# Patient Record
Sex: FEMALE | Race: OTHER | Employment: OTHER | URBAN - METROPOLITAN AREA
[De-identification: names, ages, dates, MRNs, and addresses within clinical notes are randomized per-mention and may not be internally consistent; named-entity substitution may affect disease eponyms.]

---

## 2019-11-07 ENCOUNTER — HOSPITAL ENCOUNTER (OUTPATIENT)
Dept: CV DIAGNOSTICS | Facility: HOSPITAL | Age: 79
Discharge: HOME | End: 2019-11-07
Attending: FAMILY MEDICINE
Payer: MEDICARE

## 2019-11-07 DIAGNOSIS — E78.5 HYPERLIPIDEMIA, UNSPECIFIED HYPERLIPIDEMIA TYPE: ICD-10-CM

## 2019-11-07 DIAGNOSIS — I10 ESSENTIAL (PRIMARY) HYPERTENSION: ICD-10-CM

## 2019-11-07 DIAGNOSIS — R53.83 FATIGUE, UNSPECIFIED TYPE: ICD-10-CM

## 2019-11-07 DIAGNOSIS — R06.02 SHORTNESS OF BREATH ON EXERTION: ICD-10-CM

## 2019-11-07 PROCEDURE — 93306 TTE W/DOPPLER COMPLETE: CPT | Performed by: FAMILY MEDICINE

## 2019-11-07 PROCEDURE — 78452 HT MUSCLE IMAGE SPECT MULT: CPT | Performed by: FAMILY MEDICINE

## 2019-11-07 PROCEDURE — 93018 CV STRESS TEST I&R ONLY: CPT | Performed by: FAMILY MEDICINE

## 2019-11-07 PROCEDURE — 93017 CV STRESS TEST TRACING ONLY: CPT | Performed by: FAMILY MEDICINE

## 2021-02-24 PROBLEM — E78.5 DYSLIPIDEMIA ASSOCIATED WITH TYPE 2 DIABETES MELLITUS (HCC): Status: ACTIVE | Noted: 2021-02-24

## 2021-02-24 PROBLEM — E11.649 TYPE 2 DIABETES MELLITUS WITH HYPOGLYCEMIA WITHOUT COMA, WITHOUT LONG-TERM CURRENT USE OF INSULIN (HCC): Status: ACTIVE | Noted: 2021-02-24

## 2021-02-24 PROBLEM — E11.69 DYSLIPIDEMIA ASSOCIATED WITH TYPE 2 DIABETES MELLITUS (HCC): Status: ACTIVE | Noted: 2021-02-24

## 2021-02-24 PROBLEM — I10 ESSENTIAL HYPERTENSION: Status: ACTIVE | Noted: 2021-02-24

## 2023-10-14 ENCOUNTER — EXTERNAL LAB (OUTPATIENT)
Dept: OTHER | Age: 83
End: 2023-10-14

## 2023-10-14 LAB
ALBUMIN SERPL-MCNC: 4.5 G/DL (ref 3.6–5.1)
ALBUMIN/GLOB SERPL: 1.3 (CALC) (ref 1–2.5)
ALP SERPL-CCNC: 43 U/L (ref 37–153)
ALT SERPL-CCNC: 11 U/L (ref 6–29)
AST SERPL-CCNC: 17 U/L (ref 10–35)
BILIRUB SERPL-MCNC: 0.4 MG/DL (ref 0.2–1.2)
BUN SERPL-MCNC: 8 MG/DL (ref 7–25)
BUN/CREAT SERPL: ABNORMAL (CALC) (ref 6–22)
CALCIUM SERPL-MCNC: 9.6 MG/DL (ref 8.6–10.4)
CHLORIDE SERPL-SCNC: 99 MMOL/L (ref 98–110)
CO2 SERPL-SCNC: 26 MMOL/L (ref 20–32)
CREAT SERPL-MCNC: 0.84 MG/DL (ref 0.6–0.95)
CREAT UR-MCNC: 18 MG/DL (ref 20–275)
GFR SERPLBLD SCHWARTZ-ARVRAT: 69 ML/MIN/1.73M2
GLOBULIN SER-MCNC: 3.5 G/DL (CALC) (ref 1.9–3.7)
GLUCOSE SERPL-MCNC: 116 MG/DL (ref 65–99)
LENGTH OF FAST TIME PATIENT: YES H
MICROALBUMIN UR-MCNC: 1.2 MG/DL
MICROALBUMIN/CREAT UR: 67 MCG/MG CREAT
POTASSIUM SERPL-SCNC: 4.2 MMOL/L (ref 3.5–5.3)
PROT SERPL-MCNC: 8 G/DL (ref 6.1–8.1)
SODIUM SERPL-SCNC: 136 MMOL/L (ref 135–146)

## 2024-06-27 ENCOUNTER — HOSPITAL ENCOUNTER (OUTPATIENT)
Dept: BONE DENSITY | Age: 84
Discharge: HOME | End: 2024-06-27
Attending: FAMILY MEDICINE

## 2024-06-27 DIAGNOSIS — M81.0 OSTEOPOROSIS: ICD-10-CM

## 2024-06-27 PROCEDURE — 77080 DXA BONE DENSITY AXIAL: CPT | Performed by: FAMILY MEDICINE

## 2024-06-28 ENCOUNTER — LAB ENCOUNTER (OUTPATIENT)
Dept: LAB | Age: 84
End: 2024-06-28
Attending: FAMILY MEDICINE
Payer: MEDICARE

## 2024-06-28 DIAGNOSIS — D64.9 ANEMIA, UNSPECIFIED: ICD-10-CM

## 2024-06-28 DIAGNOSIS — E78.5 HYPERLIPIDEMIA: ICD-10-CM

## 2024-06-28 DIAGNOSIS — R79.89 HYPOURICEMIA: ICD-10-CM

## 2024-06-28 DIAGNOSIS — E11.9 DIABETES MELLITUS (HCC): Primary | ICD-10-CM

## 2024-06-28 LAB
ALBUMIN SERPL-MCNC: 3.6 G/DL (ref 3.4–5)
ALBUMIN/GLOB SERPL: 0.9 {RATIO} (ref 1–2)
ALP LIVER SERPL-CCNC: 44 U/L
ALT SERPL-CCNC: 24 U/L
ANION GAP SERPL CALC-SCNC: 6 MMOL/L (ref 0–18)
AST SERPL-CCNC: 14 U/L (ref 15–37)
BASOPHILS # BLD AUTO: 0.04 X10(3) UL (ref 0–0.2)
BASOPHILS NFR BLD AUTO: 0.5 %
BILIRUB SERPL-MCNC: 0.3 MG/DL (ref 0.1–2)
BUN BLD-MCNC: 19 MG/DL (ref 9–23)
CALCIUM BLD-MCNC: 9.3 MG/DL (ref 8.5–10.1)
CHLORIDE SERPL-SCNC: 107 MMOL/L (ref 98–112)
CHOLEST SERPL-MCNC: 137 MG/DL (ref ?–200)
CO2 SERPL-SCNC: 27 MMOL/L (ref 21–32)
CREAT BLD-MCNC: 1.01 MG/DL
EGFRCR SERPLBLD CKD-EPI 2021: 55 ML/MIN/1.73M2 (ref 60–?)
EOSINOPHIL # BLD AUTO: 0.42 X10(3) UL (ref 0–0.7)
EOSINOPHIL NFR BLD AUTO: 5.2 %
ERYTHROCYTE [DISTWIDTH] IN BLOOD BY AUTOMATED COUNT: 12.5 %
EST. AVERAGE GLUCOSE BLD GHB EST-MCNC: 174 MG/DL (ref 68–126)
FASTING PATIENT LIPID ANSWER: YES
FASTING STATUS PATIENT QL REPORTED: YES
GLOBULIN PLAS-MCNC: 4 G/DL (ref 2.8–4.4)
GLUCOSE BLD-MCNC: 139 MG/DL (ref 70–99)
HBA1C MFR BLD: 7.7 % (ref ?–5.7)
HCT VFR BLD AUTO: 36.4 %
HDLC SERPL-MCNC: 49 MG/DL (ref 40–59)
HGB BLD-MCNC: 11.9 G/DL
IMM GRANULOCYTES # BLD AUTO: 0.02 X10(3) UL (ref 0–1)
IMM GRANULOCYTES NFR BLD: 0.2 %
LDLC SERPL CALC-MCNC: 56 MG/DL (ref ?–100)
LYMPHOCYTES # BLD AUTO: 2.81 X10(3) UL (ref 1–4)
LYMPHOCYTES NFR BLD AUTO: 34.9 %
MCH RBC QN AUTO: 30 PG (ref 26–34)
MCHC RBC AUTO-ENTMCNC: 32.7 G/DL (ref 31–37)
MCV RBC AUTO: 91.7 FL
MONOCYTES # BLD AUTO: 0.78 X10(3) UL (ref 0.1–1)
MONOCYTES NFR BLD AUTO: 9.7 %
NEUTROPHILS # BLD AUTO: 3.98 X10 (3) UL (ref 1.5–7.7)
NEUTROPHILS # BLD AUTO: 3.98 X10(3) UL (ref 1.5–7.7)
NEUTROPHILS NFR BLD AUTO: 49.5 %
NONHDLC SERPL-MCNC: 88 MG/DL (ref ?–130)
OSMOLALITY SERPL CALC.SUM OF ELEC: 295 MOSM/KG (ref 275–295)
PLATELET # BLD AUTO: 258 10(3)UL (ref 150–450)
POTASSIUM SERPL-SCNC: 5.2 MMOL/L (ref 3.5–5.1)
PROT SERPL-MCNC: 7.6 G/DL (ref 6.4–8.2)
RBC # BLD AUTO: 3.97 X10(6)UL
SODIUM SERPL-SCNC: 140 MMOL/L (ref 136–145)
TRIGL SERPL-MCNC: 199 MG/DL (ref 30–149)
TSI SER-ACNC: 2.14 MIU/ML (ref 0.36–3.74)
VLDLC SERPL CALC-MCNC: 29 MG/DL (ref 0–30)
WBC # BLD AUTO: 8.1 X10(3) UL (ref 4–11)

## 2024-06-28 PROCEDURE — 84443 ASSAY THYROID STIM HORMONE: CPT

## 2024-06-28 PROCEDURE — 36415 COLL VENOUS BLD VENIPUNCTURE: CPT

## 2024-06-28 PROCEDURE — 80053 COMPREHEN METABOLIC PANEL: CPT

## 2024-06-28 PROCEDURE — 83036 HEMOGLOBIN GLYCOSYLATED A1C: CPT

## 2024-06-28 PROCEDURE — 85025 COMPLETE CBC W/AUTO DIFF WBC: CPT

## 2024-06-28 PROCEDURE — 80061 LIPID PANEL: CPT

## 2024-08-09 ENCOUNTER — TELEPHONE (OUTPATIENT)
Facility: CLINIC | Age: 84
End: 2024-08-09

## 2024-08-09 ENCOUNTER — OFFICE VISIT (OUTPATIENT)
Facility: CLINIC | Age: 84
End: 2024-08-09
Payer: MEDICARE

## 2024-08-09 VITALS
WEIGHT: 94.31 LBS | DIASTOLIC BLOOD PRESSURE: 52 MMHG | BODY MASS INDEX: 19.01 KG/M2 | HEIGHT: 59 IN | SYSTOLIC BLOOD PRESSURE: 106 MMHG | OXYGEN SATURATION: 95 % | RESPIRATION RATE: 16 BRPM | HEART RATE: 75 BPM

## 2024-08-09 DIAGNOSIS — J45.50 SEVERE PERSISTENT ASTHMA WITHOUT COMPLICATION (HCC): Primary | ICD-10-CM

## 2024-08-09 PROCEDURE — 99204 OFFICE O/P NEW MOD 45 MIN: CPT | Performed by: INTERNAL MEDICINE

## 2024-08-09 RX ORDER — FLUTICASONE PROPIONATE AND SALMETEROL XINAFOATE 230; 21 UG/1; UG/1
AEROSOL, METERED RESPIRATORY (INHALATION)
COMMUNITY
Start: 2024-03-11 | End: 2024-08-09

## 2024-08-09 RX ORDER — TIOTROPIUM BROMIDE INHALATION SPRAY 3.12 UG/1
2 SPRAY, METERED RESPIRATORY (INHALATION) DAILY
Qty: 1 EACH | Refills: 5 | Status: SHIPPED | OUTPATIENT
Start: 2024-08-09

## 2024-08-09 RX ORDER — BUDESONIDE, GLYCOPYRROLATE, AND FORMOTEROL FUMARATE 160; 9; 4.8 UG/1; UG/1; UG/1
2 AEROSOL, METERED RESPIRATORY (INHALATION) 2 TIMES DAILY
Qty: 10.7 G | Refills: 5 | Status: SHIPPED | OUTPATIENT
Start: 2024-08-09 | End: 2024-08-09

## 2024-08-09 RX ORDER — SITAGLIPTIN 50 MG/1
TABLET, FILM COATED ORAL
COMMUNITY
Start: 2024-07-24

## 2024-08-09 RX ORDER — BUDESONIDE AND FORMOTEROL FUMARATE DIHYDRATE 160; 4.5 UG/1; UG/1
2 AEROSOL RESPIRATORY (INHALATION) 2 TIMES DAILY
Qty: 1 EACH | Refills: 5 | Status: SHIPPED | OUTPATIENT
Start: 2024-08-09

## 2024-08-09 RX ORDER — BUDESONIDE 0.5 MG/2ML
0.5 INHALANT ORAL DAILY
Qty: 120 ML | Refills: 5 | Status: SHIPPED | OUTPATIENT
Start: 2024-08-09

## 2024-08-09 RX ORDER — PREGABALIN 100 MG/1
CAPSULE ORAL
COMMUNITY
Start: 2024-06-25

## 2024-08-09 RX ORDER — LEVOTHYROXINE SODIUM 0.03 MG/1
TABLET ORAL
COMMUNITY
Start: 2024-07-24

## 2024-08-09 NOTE — PROGRESS NOTES
[facility] General Pulmonary Consult Note    Chief Complaint:  Chief Complaint   Patient presents with    New Patient     Establishing care Hx COPD        History of Present Illness:  Adrienne Morin is a 84 year old female with significant PMH of asthma who presents today for evaluation of asthma/COPD.  Has been told that she has COPD before - patient is a never smoker.      Is on advair BID then albuterol neb 2-3 times per day and montelukast.    Has been having worsening shortness of breath on exertion with wheezing and chest tightness.      PFTs from July 2023 FVC 0.74 56% FEV1 0.46 52% ratio 0.62    Past Medical History:   Past Medical History:    Allergic rhinitis    Asthma (HCC)    COPD (chronic obstructive pulmonary disease) (HCC)    Diabetes (HCC)    Essential hypertension    Hyperlipidemia    Thyroid disease        Past Surgical History:   Past Surgical History:   Procedure Laterality Date    Cataract         Family Medical History:   Family History   Problem Relation Age of Onset    Heart Disorder Mother     Diabetes Father     Heart Disorder Father         Social History:   Social History     Socioeconomic History    Marital status:      Spouse name: Not on file    Number of children: Not on file    Years of education: Not on file    Highest education level: Not on file   Occupational History    Not on file   Tobacco Use    Smoking status: Never    Smokeless tobacco: Never   Substance and Sexual Activity    Alcohol use: Never    Drug use: Never    Sexual activity: Not on file   Other Topics Concern    Not on file   Social History Narrative    Not on file     Social Determinants of Health     Financial Resource Strain: Not on file   Food Insecurity: Not on file   Transportation Needs: Not on file   Physical Activity: Not on file   Stress: Not on file   Social Connections: Unknown (3/9/2021)    Received from [facility]    Social Connections     Conversations with  friends/family/neighbors per week: Not on file   Housing Stability: Low Risk  (7/10/2021)    Received from [facility]    Housing Stability     Mortgage Payment Concerns?: Not on file     Number of Places Lived in the Last Year: Not on file     Unstable Housing?: Not on file        Allergies: Penicillins     Medications:   Current Outpatient Medications   Medication Sig Dispense Refill    JANUVIA 50 MG Oral Tab       pregabalin 100 MG Oral Cap       levothyroxine 25 MCG Oral Tab       budeson-glycopyrrol-formoterol (BREZTRI AEROSPHERE) 160-9-4.8 MCG/ACT Inhalation Aerosol Inhale 2 puffs into the lungs 2 (two) times daily. 10.7 g 5    budesonide 0.5 MG/2ML Inhalation Suspension Take 2 mL (0.5 mg total) by nebulization daily. 120 mL 5    metFORMIN HCl 500 MG Oral Tab TAKE 2 TABLETS BY MOUTH DAILY FOR DIABETES 180 tablet 1    Accu-Chek Softclix Lancets Does not apply Misc as directed      alendronate 70 MG Oral Tab TAKE 1 TABLET ONCE A WEEK 1ST THING IN MORNING ON EMPTY STOMACH WITH FULL GLASS OF WATER      Montelukast Sodium 10 MG Oral Tab Take 1 tablet (10 mg total) by mouth daily.      glipiZIDE ER 5 MG Oral Tablet 24 Hr 1 tablet with food      Magnesium 300 MG Oral Cap 1 capsule with a meal      Esomeprazole Magnesium 40 MG Oral Capsule Delayed Release TAKE 1 CAPSULE BY MOUTH ONCE A DAY FOR ACID REFLUX (STOMACH PROBLEM)      Desonide 0.05 % External Cream 1 application      Calcium Carb-Cholecalciferol (CALCIUM+D3) 600-800 MG-UNIT Oral Tab 1 tablet with a meal      Clopidogrel Bisulfate 75 MG Oral Tab TAKE 1 TABLET BY MOUTH ONCE DAILY FOR BLOOD THINNER      Rosuvastatin Calcium 10 MG Oral Capsule Sprinkle TAKE 1 TABLET BY MOUTH AT BEDTIME FOR CHOLESTEROL      Budesonide-Formoterol Fumarate (SYMBICORT) 80-4.5 MCG/ACT Inhalation Aerosol INHALE 2 PUFFS BY MOUTH TWICE DAILY      Albuterol Sulfate HFA (VENTOLIN HFA) 108 (90 Base) MCG/ACT Inhalation Aero Soln TAKE 2 PUFFS EVERY 6 HOURS AS NEEDED FOR  WHEEZING      Telmisartan 80 MG Oral Tab 1 tablet      Lancets (ONETOUCH DELICA PLUS [id number]) Does not apply Misc USE 3 TO 4 TIMES DAILY AS DIRECTED      OneTouch UltraSoft Lancets Does not apply Misc as directed      Alcohol Swabs (PRO COMFORT ALCOHOL) 70 % Does not apply Pads USE 3 TO 4 TIMES DAILY AS DIRECTED      MELOXICAM 15 MG Oral Tab TAKE 1 TABLET (15 MG TOTAL) BY MOUTH DAILY. WITH FOOD 30 tablet 2       Review of Systems: Review of Systems    Physical Exam:  /52 (BP Location: Right arm, Patient Position: Sitting, Cuff Size: adult)   Pulse 75   Resp 16   Ht 4' 11\" (1.499 m)   Wt 94 lb 4.8 oz (42.8 kg)   SpO2 95%   BMI 19.05 kg/m²      Constitutional: alert, cooperative. No acute distress.  HEENT: Head NC/AT. Nares normal. Septum midline. Mucosa normal. No drainage or sinus tenderness.. Mallampati 1+  Cardio: Regular rate and rhythm. Normal S1 and S2. No murmurs.   Respiratory: Thorax symmetrical with no labored breathing. clear to auscultation bilaterally  GI: NABS. Abd soft, non-tender.  Extremities: No clubbing or cyanosis. No BLE edema.    Neurologic: A&Ox3. No gross motor deficits.  Skin: Warm, dry  Psych: Calm, cooperative. Pleasant affect.    Results:  Personally reviewed  WBC: 8.1, done on 6/28/2024.  HGB: 11.9, done on 6/28/2024.  PLT: 258, done on 6/28/2024.     Glucose: 139, done on 6/28/2024.  Cr: 1.01, done on 6/28/2024.  Last eGFR was 55 on 6/28/2024.  CA: 9.3, done on 6/28/2024.  Na: 140, done on 6/28/2024.  K: 5.2, done on 6/28/2024.  Cl: 107, done on 6/28/2024.  CO2: 27, done on 6/28/2024.  Last ALB was 3.6% done on 6/28/2024.     XR DEXA BONE DENSITOMETRY (CPT=77080)    Result Date: 6/27/2024  CONCLUSION:  Bone mineral density values for left hip are compatible with the diagnosis of osteoporosis by WHO definition (T score less than -2.5). If therapy is initiated, a follow-up study in 1 year may aid in evaluation of therapeutic efficacy.  Recommendation:  The National  Osteoporosis Foundation (NOF) recommends pharmacological treatment for patients with a FRAX 10-year risk score of 3% or higher for a hip fracture or 20% or higher for a major osteoporotic fracture, to prevent osteoporosis and reduce fracture risk.  The World Health Organization has defined the following categories based on bone density: Normal bone:  T-score greater than or equal to -1 Osteopenia: T-score  less than -1 and greater  than -2.5 Osteoporosis:  T-score less than or equal -2.5   LOCATION:  [facility]     Dictated by (CST): Ellie Scott MD on 6/27/2024 at 12:01 PM     Finalized by (CST): Ellie Scott MD on 6/27/2024 at 12:01 PM         Assessment/Plan:  #1. Severe persistent asthma FEV1 52%  Has been on advair for long time  Increase to [facility].  I dont think trelegy would be good option for her with her ability take a deep breath in  Recheck PFTs  Check asthma allergen panel  I suspect will be a good candidate for biologics down the line as I suspect she has fixed airways obstruction in the setting of severe asthma  The pathology and natural history of asthma was explained to the patient. We reviewed the role of pharmacotherapy, the need for exercise and avoidance of smoke and other environmental pollutants and allergens. We've discussed the importance of compliance with the various treatment modalities such as beta agonists and inhaled steroids to achieve adequate asthma control which was defined for the patient as requiring use of the rescue inhaler less than 2x/week, not more than 2 nocturnal awakenings due to asthma in a month, and few or no limitations in exercise or other physical activities due to asthma. The concepts of prophylactic and episodic therapy have been discussed. The patient indicates understanding of these issues.       Return in about 4 weeks (around 9/6/2024).    Yris Landin MD  8/9/2024

## 2024-08-09 NOTE — TELEPHONE ENCOUNTER
Advised pt that [facility] will not cover Marisai.  Per Dr. Landin, send prescription for Symbicort 160-4.5 + Spiriva 2.5.  Daughter instructed that pt should rinse mouth or brush his teeth after use of Symbicort.  Prescription sent to pharmacy.

## 2024-08-12 ENCOUNTER — TELEPHONE (OUTPATIENT)
Facility: CLINIC | Age: 84
End: 2024-08-12

## 2024-08-12 DIAGNOSIS — J45.50 SEVERE PERSISTENT ASTHMA WITHOUT COMPLICATION (HCC): Primary | ICD-10-CM

## 2024-08-13 ENCOUNTER — HOSPITAL ENCOUNTER (OUTPATIENT)
Dept: GENERAL RADIOLOGY | Age: 84
Discharge: HOME | End: 2024-08-13
Attending: PODIATRIST
Payer: MEDICARE

## 2024-08-13 ENCOUNTER — OFFICE VISIT (OUTPATIENT)
Facility: LOCATION | Age: 84
End: 2024-08-13
Payer: MEDICARE

## 2024-08-13 ENCOUNTER — LAB ENCOUNTER (OUTPATIENT)
Dept: LAB | Age: 84
End: 2024-08-13
Attending: INTERNAL MEDICINE
Payer: MEDICARE

## 2024-08-13 DIAGNOSIS — E11.9 TYPE 2 DIABETES MELLITUS WITHOUT COMPLICATION, UNSPECIFIED WHETHER LONG TERM INSULIN USE (HCC): ICD-10-CM

## 2024-08-13 DIAGNOSIS — M81.0 OSTEOPOROSIS, UNSPECIFIED OSTEOPOROSIS TYPE, UNSPECIFIED PATHOLOGICAL FRACTURE PRESENCE: ICD-10-CM

## 2024-08-13 DIAGNOSIS — J45.50 SEVERE PERSISTENT ASTHMA WITHOUT COMPLICATION (HCC): ICD-10-CM

## 2024-08-13 DIAGNOSIS — M79.671 RIGHT FOOT PAIN: ICD-10-CM

## 2024-08-13 DIAGNOSIS — S92.511A CLOSED DISPLACED FRACTURE OF PROXIMAL PHALANX OF LESSER TOE OF RIGHT FOOT, INITIAL ENCOUNTER: Primary | ICD-10-CM

## 2024-08-13 DIAGNOSIS — R60.0 EDEMA OF RIGHT FOOT: ICD-10-CM

## 2024-08-13 PROCEDURE — 82785 ASSAY OF IGE: CPT

## 2024-08-13 PROCEDURE — 36415 COLL VENOUS BLD VENIPUNCTURE: CPT

## 2024-08-13 PROCEDURE — 28510 TREATMENT OF TOE FRACTURE: CPT | Performed by: PODIATRIST

## 2024-08-13 PROCEDURE — 86003 ALLG SPEC IGE CRUDE XTRC EA: CPT

## 2024-08-13 PROCEDURE — 73630 X-RAY EXAM OF FOOT: CPT | Performed by: PODIATRIST

## 2024-08-13 PROCEDURE — 99203 OFFICE O/P NEW LOW 30 MIN: CPT | Performed by: PODIATRIST

## 2024-08-13 NOTE — PROGRESS NOTES
[facility]  Progress Note    Adrienne Morin is a 84 year old female.   Chief Complaint   Patient presents with    New Patient     Patient tripped/missed a step and twisted the right foot. Rates pain 8/10, she is diabetic usually has numbness/tingling. FBS this am was not taken, per doctor order. Right foot is swollen and a little bruising.         HPI:     Patient is a pleasant 84-year-old diabetic female who is presenting to clinic today with her children due to a right foot injury.  Patient states that she tripped/missed a step and twisted her right foot a few days ago.  She is having pain to her right second digit, rating the pain 8/10.  Patient does relate baseline numbness/tingling due to peripheral neuropathy.  Patient has noticed some swelling.  No other concerns at this time.  Presenting today for further evaluation and care.  Denies recent nausea, vomiting, fevers, chills.      Allergies: Penicillins   Current Outpatient Medications   Medication Sig Dispense Refill    JANUVIA 50 MG Oral Tab       pregabalin 100 MG Oral Cap       levothyroxine 25 MCG Oral Tab       budesonide 0.5 MG/2ML Inhalation Suspension Take 2 mL (0.5 mg total) by nebulization daily. 120 mL 5    SYMBICORT 160-4.5 MCG/ACT Inhalation Aerosol Inhale 2 puffs into the lungs 2 (two) times daily. 1 each 5    SPIRIVA RESPIMAT 2.5 MCG/ACT Inhalation Aero Soln Inhale 2 Inhalations into the lungs daily. 1 each 5    Accu-Chek Softclix Lancets Does not apply Misc as directed      alendronate 70 MG Oral Tab TAKE 1 TABLET ONCE A WEEK 1ST THING IN MORNING ON EMPTY STOMACH WITH FULL GLASS OF WATER      Montelukast Sodium 10 MG Oral Tab Take 1 tablet (10 mg total) by mouth daily.      Magnesium 300 MG Oral Cap 1 capsule with a meal      Esomeprazole Magnesium 40 MG Oral Capsule Delayed Release TAKE 1 CAPSULE BY MOUTH ONCE A DAY FOR ACID REFLUX (STOMACH PROBLEM)      Calcium Carb-Cholecalciferol (CALCIUM+D3) 600-800 MG-UNIT Oral Tab 1  tablet with a meal      Rosuvastatin Calcium 10 MG Oral Capsule Sprinkle TAKE 1 TABLET BY MOUTH AT BEDTIME FOR CHOLESTEROL      Albuterol Sulfate HFA (VENTOLIN HFA) 108 (90 Base) MCG/ACT Inhalation Aero Soln TAKE 2 PUFFS EVERY 6 HOURS AS NEEDED FOR WHEEZING      Telmisartan 80 MG Oral Tab 1 tablet      Lancets (ONETOUCH DELICA PLUS [id number]) Does not apply Misc USE 3 TO 4 TIMES DAILY AS DIRECTED      OneTouch UltraSoft Lancets Does not apply Misc as directed      Alcohol Swabs (PRO COMFORT ALCOHOL) 70 % Does not apply Pads USE 3 TO 4 TIMES DAILY AS DIRECTED      Desonide 0.05 % External Cream 1 application        Past Medical History:    Allergic rhinitis    Asthma (HCC)    COPD (chronic obstructive pulmonary disease) (HCC)    Diabetes (HCC)    Essential hypertension    Hyperlipidemia    Thyroid disease      Past Surgical History:   Procedure Laterality Date    Cataract        Family History   Problem Relation Age of Onset    Heart Disorder Mother     Diabetes Father     Heart Disorder Father       Social History     Socioeconomic History    Marital status:    Tobacco Use    Smoking status: Never    Smokeless tobacco: Never   Substance and Sexual Activity    Alcohol use: Never    Drug use: Never           REVIEW OF SYSTEMS:     10 point ROS completed and was negative, except for pertinent positive and negatives stated in subjective.       EXAM:     GENERAL: well developed, well nourished, in no apparent distress  EXTREMITIES:  1. Integument: Skin appears moist, cool, and atrophic. There are no color changes. No open lesions. No macerations. No Hyperkeratotic lesions.   2. Vascular:  pedal pulses palpable.  capillary refill normal.  Localized edema noted to right forefoot  3. Neurological: Gross sensation intact via light touch bilaterally.    4. Musculoskeletal: Pain with palpation of right second digit.  There is a lateral deviation of the right second digit     XR FOOT, COMPLETE (MIN 3 VIEWS), RIGHT  (RQF=58172)    Result Date: 8/13/2024  CONCLUSION:    FRACTURE proximal aspect of the shaft of the proximal phalanx 2nd toe with minimal displacement.  No other fractures seen.  No dislocation.  Scattered mild arthritic changes not uncommon for age. Plantar calcaneal spur, and spurring at the  insertion of the Achilles tendon onto the calcaneus. Continued clinical and x-ray follow-up advised.  LOCATION:  [facility]   Dictated by (CST): Rick Sauceda MD on 8/13/2024 at 11:57 AM     Finalized by (CST): Rick Sauceda MD on 8/13/2024 at 11:58 AM          ASSESSMENT AND PLAN:   Diagnoses and all orders for this visit:    Closed displaced fracture of proximal phalanx of lesser toe of right foot, initial encounter    Right foot pain  -     XR FOOT, COMPLETE (MIN 3 VIEWS), RIGHT (CPT=73630); Future    Edema of right foot    Type 2 diabetes mellitus without complication, unspecified whether long term insulin use (HCC)    Osteoporosis, unspecified osteoporosis type, unspecified pathological fracture presence        Plan:   -Patient was seen and evaluated today in clinic.  Chart history reviewed.    -Updated radiographs were obtained today and independently reviewed, showing minimally displaced fracture of the proximal phalanx of the right second digit.  See above for impression    -Discussed clinical and radiographical findings with patient today.  Treatment options discussed.    -Fracture care and treatment plan discussed.    -Discussed the importance of immobilization.    -Discussed conservative management     -Discussed surgical management and indications for both.    -Will move forward with conservative management.  Patient does have a history of osteoporosis from a recent DEXA scan on 6/27/2024.  Discussed with patient and her family of complications that can occur due to osteoporosis.  Recommend she continue with vitamin D supplementation daily.    -Recommend cryotherapy/icing, rest, and elevation.    -Second digit was  lightly ela taped with Coban to hallux with a 2 x 2 gauze in between the toes.  Due to orientation of fracture and mild displacement, ela taping to hallux is a better option than ela taping the third digit, which could exacerbate displacement/toe deformity    -Patient provided with a postop shoe today.  Recommend weightbearing as tolerated in postop shoe at all times right now.  Can utilize a walker/cane for gait assistance      -The patient indicates understanding of these issues and agrees to the plan.    Time spent reviewing pertinent information from patient's chart, reviewing any pertinent imaging, obtaining history and physical exam, discussing and mutually agreeing on a treatment plan, and documenting encounter: 30 minutes    RTC 6 weeks for updated x-rays      Gordo Le DPM        8/13/2024    Dragon speech recognition software was used to prepare this note.  Errors in word recognition may occur.  Please contact me with any questions/concerns with this note.

## 2024-08-16 LAB

## 2024-09-23 ENCOUNTER — RT VISIT (OUTPATIENT)
Dept: RESPIRATORY THERAPY | Facility: HOSPITAL | Age: 84
End: 2024-09-23
Attending: INTERNAL MEDICINE
Payer: MEDICARE

## 2024-09-23 DIAGNOSIS — J45.50 SEVERE PERSISTENT ASTHMA WITHOUT COMPLICATION (HCC): ICD-10-CM

## 2024-09-23 PROCEDURE — 94060 EVALUATION OF WHEEZING: CPT

## 2024-09-23 PROCEDURE — 94729 DIFFUSING CAPACITY: CPT

## 2024-09-23 PROCEDURE — 94726 PLETHYSMOGRAPHY LUNG VOLUMES: CPT

## 2024-09-23 NOTE — PROCEDURES
Pulmonary Function Test Report  Findings:  Prebronchodilator FEV1 is 0.63L, z-score -3.55.  Prebronchodilator FVC is 1.00L, z-score -3.59.                           Postbronchodilator FEV1 is 0.73L, z-score -3.26.  Postbronchodilator FVC is 1.25L, z-score -2.92.   FEV1/ FVC ratio is 62 %, z-score -1.69.   There is a significant bronchodilator response after administration of albuterol.    The flow-volume loop demonstrates an obstructive pattern. Possibly also a restrictive pattern as well  The TLC is 3.38L, z-score -2.14. The residual volume 2.17L, z-score 0.38.   The diffusion capacity is 10.63, z-score -2.25 and -2.85 when corrected for alveolar volume.     Impression:  There is mild (z-score -1.65 to -2.5) airway obstruction on spirometry and visualized on flow-volume loop.   and There is a significant bronchodilator response.  There is mild (z-score -1.65 to -2.5) restriction.  This can be seen in heart failure, fluid overload states, interstitial lung disease, thoracic spine/chest disorders, obesity as well as other clinical scenarios.  Further clinical correlation required.    The RV/TLC ratio is elevated which can be seen with obstructive ventilatory defects  Diffusion capacity is mild (z-score -1.65 to -2.5).        Disclaimer: This PFT has been interpreted based on Z-score/LLN/ULN criteria as described in ATS guidelines 2022.   Yris Landin MD  [facility] Pulmonary Medicine  Office: [phone]

## 2024-09-25 ENCOUNTER — OFFICE VISIT (OUTPATIENT)
Facility: LOCATION | Age: 84
End: 2024-09-25
Payer: MEDICARE

## 2024-09-25 ENCOUNTER — HOSPITAL ENCOUNTER (OUTPATIENT)
Dept: GENERAL RADIOLOGY | Age: 84
Discharge: HOME | End: 2024-09-25
Attending: PODIATRIST
Payer: MEDICARE

## 2024-09-25 DIAGNOSIS — S92.511A CLOSED DISPLACED FRACTURE OF PROXIMAL PHALANX OF LESSER TOE OF RIGHT FOOT, INITIAL ENCOUNTER: ICD-10-CM

## 2024-09-25 DIAGNOSIS — M81.0 OSTEOPOROSIS, UNSPECIFIED OSTEOPOROSIS TYPE, UNSPECIFIED PATHOLOGICAL FRACTURE PRESENCE: ICD-10-CM

## 2024-09-25 DIAGNOSIS — S92.511D CLOSED DISPLACED FRACTURE OF PROXIMAL PHALANX OF LESSER TOE OF RIGHT FOOT WITH ROUTINE HEALING, SUBSEQUENT ENCOUNTER: Primary | ICD-10-CM

## 2024-09-25 DIAGNOSIS — M79.674 TOE PAIN, RIGHT: ICD-10-CM

## 2024-09-25 DIAGNOSIS — B35.1 ONYCHOMYCOSIS: ICD-10-CM

## 2024-09-25 DIAGNOSIS — E11.9 TYPE 2 DIABETES MELLITUS WITHOUT COMPLICATION, UNSPECIFIED WHETHER LONG TERM INSULIN USE (HCC): ICD-10-CM

## 2024-09-25 PROCEDURE — 99024 POSTOP FOLLOW-UP VISIT: CPT | Performed by: PODIATRIST

## 2024-09-25 PROCEDURE — 73630 X-RAY EXAM OF FOOT: CPT | Performed by: PODIATRIST

## 2024-09-25 NOTE — PROGRESS NOTES
[facility]  Progress Note    Adrienne Morin is a 84 year old female.   Chief Complaint   Patient presents with    Fracture     Right foot 2nd digit fracture, she is in a surgical shoe and ela taping toes. She rates pain 3-4/10 today in the office.          HPI:     Patient is a pleasant 84-year-old diabetic female who is returning to clinic today for recheck on right second digit fracture.  Patient's daughter is accompanying her today and assists in translation.  Patient is doing well and states that the pain has improved.  Rating the pain today 3-4/10.  She continues to ambulate in a postop shoe.  Has been ela taping, but denies any increased deformities.  Patient's daughter is inquiring about a toenail debridement today as patient cannot safely trim her own toenails.  Denying any other concerns today and is here for further evaluation and care.  Denies recent nausea, vomiting, fevers, chills.      Allergies: Penicillins   Current Outpatient Medications   Medication Sig Dispense Refill    JANUVIA 50 MG Oral Tab       pregabalin 100 MG Oral Cap       levothyroxine 25 MCG Oral Tab       budesonide 0.5 MG/2ML Inhalation Suspension Take 2 mL (0.5 mg total) by nebulization daily. 120 mL 5    SYMBICORT 160-4.5 MCG/ACT Inhalation Aerosol Inhale 2 puffs into the lungs 2 (two) times daily. 1 each 5    SPIRIVA RESPIMAT 2.5 MCG/ACT Inhalation Aero Soln Inhale 2 Inhalations into the lungs daily. 1 each 5    Accu-Chek Softclix Lancets Does not apply Misc as directed      alendronate 70 MG Oral Tab TAKE 1 TABLET ONCE A WEEK 1ST THING IN MORNING ON EMPTY STOMACH WITH FULL GLASS OF WATER      Montelukast Sodium 10 MG Oral Tab Take 1 tablet (10 mg total) by mouth daily.      Magnesium 300 MG Oral Cap 1 capsule with a meal      Esomeprazole Magnesium 40 MG Oral Capsule Delayed Release TAKE 1 CAPSULE BY MOUTH ONCE A DAY FOR ACID REFLUX (STOMACH PROBLEM)      Calcium Carb-Cholecalciferol (CALCIUM+D3) 600-800  MG-UNIT Oral Tab 1 tablet with a meal      Rosuvastatin Calcium 10 MG Oral Capsule Sprinkle TAKE 1 TABLET BY MOUTH AT BEDTIME FOR CHOLESTEROL      Albuterol Sulfate HFA (VENTOLIN HFA) 108 (90 Base) MCG/ACT Inhalation Aero Soln TAKE 2 PUFFS EVERY 6 HOURS AS NEEDED FOR WHEEZING      Telmisartan 80 MG Oral Tab 1 tablet      Lancets (ONETOUCH DELICA PLUS [id number]) Does not apply Misc USE 3 TO 4 TIMES DAILY AS DIRECTED      OneTouch UltraSoft Lancets Does not apply Misc as directed      Alcohol Swabs (PRO COMFORT ALCOHOL) 70 % Does not apply Pads USE 3 TO 4 TIMES DAILY AS DIRECTED      Desonide 0.05 % External Cream 1 application        Past Medical History:    Allergic rhinitis    Asthma (HCC)    COPD (chronic obstructive pulmonary disease) (HCC)    Diabetes (HCC)    Essential hypertension    Hyperlipidemia    Thyroid disease      Past Surgical History:   Procedure Laterality Date    Cataract        Family History   Problem Relation Age of Onset    Heart Disorder Mother     Diabetes Father     Heart Disorder Father       Social History     Socioeconomic History    Marital status:    Tobacco Use    Smoking status: Never    Smokeless tobacco: Never   Substance and Sexual Activity    Alcohol use: Never    Drug use: Never           REVIEW OF SYSTEMS:     10 point ROS completed and was negative, except for pertinent positive and negatives stated in subjective.       EXAM:     GENERAL: well developed, well nourished, in no apparent distress  EXTREMITIES:  1. Integument: Skin appears moist, cool, and atrophic. There are no color changes. No open lesions. No macerations. No Hyperkeratotic lesions.  Toenails x 10 are thickened, elongated, discolored, dystrophic with some subungual debris  2. Vascular:  pedal pulses palpable.  capillary refill normal.  Improved localized edema noted to right forefoot  3. Neurological: Gross sensation intact via light touch bilaterally.    4. Musculoskeletal: Minimal pain with palpation of  right second digit.  There is a lateral deviation of the right second digit, which appears stable from previous visit     XR FOOT WEIGHTBEARING (3 VIEWS), RIGHT   (CPT=73630)    Result Date: 9/25/2024  CONCLUSION:  There is minimal bony callus formation along the healing minimally angulated fracture along the proximal 3rd of the proximal phalanx of the right 2nd digit.    LOCATION:  [facility]   Dictated by (CST): Oh Ryan MD on 9/25/2024 at 11:03 AM     Finalized by (CST): Oh Ryan MD on 9/25/2024 at 11:03 AM          ASSESSMENT AND PLAN:   Diagnoses and all orders for this visit:    Closed displaced fracture of proximal phalanx of lesser toe of right foot with routine healing, subsequent encounter  -     XR FOOT WEIGHTBEARING (3 VIEWS), RIGHT   (CPT=73630); Future    Toe pain, right    Osteoporosis, unspecified osteoporosis type, unspecified pathological fracture presence    Onychomycosis    Type 2 diabetes mellitus without complication, unspecified whether long term insulin use (HCC)        Plan:   -Patient was seen and evaluated today in clinic.  Chart history reviewed.    Updated radiographs were obtained today and independently reviewed, showing some bony callus formation with stable alignment of the second digit with a mild lateral deviation.  See above for official impression    Overall, patient is improved with right second digit.    Discussed further treatment recommendations.  At this time, recommend patient transitioning out of postop shoe and into supportive shoe gear as tolerated.  No longer needs to ela tape the toes.  We will continue to monitor for improvements in pain.    -Discussed treatment options with the patient in regards to toenails.  -If thickness of the toenails are causing discomfort, advised patient to try utilizing Vicks VapoRub on her toenails every night.  The goal would be for the toenails to soften over time.  -Patient relates pain relief with intermittent toenail  debridements.  -Patient elects for nail debridement today. Toenails 1-5 of the left foot and 1-5 of the right foot were debrided today.  They tolerated procedures well, without incident.    -Instrumentation used includes nail nippers.    -The patient indicates understanding of these issues and agrees to the plan.    Time spent reviewing pertinent information from patient's chart, reviewing any pertinent imaging, obtaining history and physical exam, discussing and mutually agreeing on a treatment plan, and documenting encounter: 25 minutes    RTC 2-3 months      Gordo Le DPM        [address] [address]     9/25/2024    Dragon speech recognition software was used to prepare this note.  Errors in word recognition may occur.  Please contact me with any questions/concerns with this note.

## 2024-09-28 ENCOUNTER — LAB ENCOUNTER (OUTPATIENT)
Dept: LAB | Age: 84
End: 2024-09-28
Attending: FAMILY MEDICINE
Payer: MEDICARE

## 2024-09-28 DIAGNOSIS — Z00.00 ROUTINE GENERAL MEDICAL EXAMINATION AT A HEALTH CARE FACILITY: Primary | ICD-10-CM

## 2024-09-28 DIAGNOSIS — R20.8 BURNING SENSATION: ICD-10-CM

## 2024-09-28 DIAGNOSIS — I10 ESSENTIAL HYPERTENSION, MALIGNANT: ICD-10-CM

## 2024-09-28 DIAGNOSIS — E11.9 DIABETES MELLITUS (HCC): ICD-10-CM

## 2024-09-28 DIAGNOSIS — M81.0 SENILE OSTEOPOROSIS: ICD-10-CM

## 2024-09-28 DIAGNOSIS — E11.9 DIABETES MELLITUS WITH NO COMPLICATION (HCC): ICD-10-CM

## 2024-09-28 DIAGNOSIS — D64.9 ANEMIA, UNSPECIFIED: ICD-10-CM

## 2024-09-28 DIAGNOSIS — E78.5 HYPERLIPIDEMIA: ICD-10-CM

## 2024-09-28 LAB
ALBUMIN SERPL-MCNC: 4.8 G/DL (ref 3.2–4.8)
ALBUMIN/GLOB SERPL: 1.5 {RATIO} (ref 1–2)
ALP LIVER SERPL-CCNC: 50 U/L
ALT SERPL-CCNC: 26 U/L
ANION GAP SERPL CALC-SCNC: 7 MMOL/L (ref 0–18)
AST SERPL-CCNC: 22 U/L (ref ?–34)
BASOPHILS # BLD AUTO: 0.05 X10(3) UL (ref 0–0.2)
BASOPHILS NFR BLD AUTO: 0.6 %
BILIRUB SERPL-MCNC: 0.4 MG/DL (ref 0.2–1.1)
BUN BLD-MCNC: 18 MG/DL (ref 9–23)
CALCIUM BLD-MCNC: 10.4 MG/DL (ref 8.7–10.4)
CHLORIDE SERPL-SCNC: 106 MMOL/L (ref 98–112)
CHOLEST SERPL-MCNC: 149 MG/DL (ref ?–200)
CO2 SERPL-SCNC: 26 MMOL/L (ref 21–32)
CREAT BLD-MCNC: 1.01 MG/DL
CREAT UR-SCNC: 26.8 MG/DL
EGFRCR SERPLBLD CKD-EPI 2021: 55 ML/MIN/1.73M2 (ref 60–?)
EOSINOPHIL # BLD AUTO: 0.24 X10(3) UL (ref 0–0.7)
EOSINOPHIL NFR BLD AUTO: 2.8 %
ERYTHROCYTE [DISTWIDTH] IN BLOOD BY AUTOMATED COUNT: 14 %
EST. AVERAGE GLUCOSE BLD GHB EST-MCNC: 171 MG/DL (ref 68–126)
FASTING PATIENT LIPID ANSWER: YES
FASTING STATUS PATIENT QL REPORTED: YES
GLOBULIN PLAS-MCNC: 3.2 G/DL (ref 2–3.5)
GLUCOSE BLD-MCNC: 139 MG/DL (ref 70–99)
HBA1C MFR BLD: 7.6 % (ref ?–5.7)
HCT VFR BLD AUTO: 36.4 %
HDLC SERPL-MCNC: 56 MG/DL (ref 40–59)
HGB BLD-MCNC: 11.8 G/DL
IMM GRANULOCYTES # BLD AUTO: 0.03 X10(3) UL (ref 0–1)
IMM GRANULOCYTES NFR BLD: 0.3 %
LDLC SERPL CALC-MCNC: 66 MG/DL (ref ?–100)
LYMPHOCYTES # BLD AUTO: 2.78 X10(3) UL (ref 1–4)
LYMPHOCYTES NFR BLD AUTO: 32 %
MCH RBC QN AUTO: 30.3 PG (ref 26–34)
MCHC RBC AUTO-ENTMCNC: 32.4 G/DL (ref 31–37)
MCV RBC AUTO: 93.3 FL
MICROALBUMIN UR-MCNC: 0.7 MG/DL
MICROALBUMIN/CREAT 24H UR-RTO: 26.1 UG/MG (ref ?–30)
MONOCYTES # BLD AUTO: 0.83 X10(3) UL (ref 0.1–1)
MONOCYTES NFR BLD AUTO: 9.6 %
NEUTROPHILS # BLD AUTO: 4.75 X10 (3) UL (ref 1.5–7.7)
NEUTROPHILS # BLD AUTO: 4.75 X10(3) UL (ref 1.5–7.7)
NEUTROPHILS NFR BLD AUTO: 54.7 %
NONHDLC SERPL-MCNC: 93 MG/DL (ref ?–130)
OSMOLALITY SERPL CALC.SUM OF ELEC: 292 MOSM/KG (ref 275–295)
PLATELET # BLD AUTO: 299 10(3)UL (ref 150–450)
POTASSIUM SERPL-SCNC: 5.2 MMOL/L (ref 3.5–5.1)
PROT SERPL-MCNC: 8 G/DL (ref 5.7–8.2)
RBC # BLD AUTO: 3.9 X10(6)UL
SODIUM SERPL-SCNC: 139 MMOL/L (ref 136–145)
TRIGL SERPL-MCNC: 160 MG/DL (ref 30–149)
TSI SER-ACNC: 4.78 MIU/ML (ref 0.55–4.78)
VLDLC SERPL CALC-MCNC: 24 MG/DL (ref 0–30)
WBC # BLD AUTO: 8.7 X10(3) UL (ref 4–11)

## 2024-09-28 PROCEDURE — 82043 UR ALBUMIN QUANTITATIVE: CPT

## 2024-09-28 PROCEDURE — 85025 COMPLETE CBC W/AUTO DIFF WBC: CPT

## 2024-09-28 PROCEDURE — 80053 COMPREHEN METABOLIC PANEL: CPT

## 2024-09-28 PROCEDURE — 36415 COLL VENOUS BLD VENIPUNCTURE: CPT

## 2024-09-28 PROCEDURE — 82570 ASSAY OF URINE CREATININE: CPT

## 2024-09-28 PROCEDURE — 80061 LIPID PANEL: CPT

## 2024-09-28 PROCEDURE — 84443 ASSAY THYROID STIM HORMONE: CPT

## 2024-09-28 PROCEDURE — 83036 HEMOGLOBIN GLYCOSYLATED A1C: CPT

## 2024-09-30 ENCOUNTER — OFFICE VISIT (OUTPATIENT)
Facility: CLINIC | Age: 84
End: 2024-09-30
Payer: MEDICARE

## 2024-09-30 VITALS
HEART RATE: 82 BPM | DIASTOLIC BLOOD PRESSURE: 54 MMHG | RESPIRATION RATE: 16 BRPM | OXYGEN SATURATION: 98 % | SYSTOLIC BLOOD PRESSURE: 118 MMHG | WEIGHT: 143 LBS | HEIGHT: 59 IN | BODY MASS INDEX: 28.83 KG/M2

## 2024-09-30 DIAGNOSIS — J45.50 SEVERE PERSISTENT ASTHMA WITHOUT COMPLICATION (HCC): Primary | ICD-10-CM

## 2024-09-30 PROCEDURE — 99214 OFFICE O/P EST MOD 30 MIN: CPT | Performed by: INTERNAL MEDICINE

## 2024-09-30 NOTE — PROGRESS NOTES
[facility] Pulmonary Follow Up Note    Chief Complaint:  Chief Complaint   Patient presents with    Follow - Up     PFT performed 9/23/24.  Labs completed 9-28-24       History of Present Illness:  Adrienne Morin is a 84 year old female who presents today for follow up of asthma/COPD.  Has been told that she has COPD before - patient is a never smoker.       Is on advair BID then albuterol neb 2-3 times per day and montelukast.    Has been having worsening shortness of breath on exertion with wheezing and chest tightness.      Interval history:  Since last visit, patient started on Symbicort/spiriva as well as budesonide nebulizers with some modest improvement.      Past Medical History:   Past Medical History:    Allergic rhinitis    Asthma (HCC)    COPD (chronic obstructive pulmonary disease) (HCC)    Diabetes (HCC)    Essential hypertension    Hyperlipidemia    Thyroid disease        Past Surgical History:   Past Surgical History:   Procedure Laterality Date    Cataract         Family Medical History:   Family History   Problem Relation Age of Onset    Heart Disorder Mother     Diabetes Father     Heart Disorder Father         Social History:   Social History     Socioeconomic History    Marital status:      Spouse name: Not on file    Number of children: Not on file    Years of education: Not on file    Highest education level: Not on file   Occupational History    Not on file   Tobacco Use    Smoking status: Never    Smokeless tobacco: Never   Substance and Sexual Activity    Alcohol use: Never    Drug use: Never    Sexual activity: Not on file   Other Topics Concern    Not on file   Social History Narrative    Not on file     Social Determinants of Health     Financial Resource Strain: Not on file   Food Insecurity: Not on file   Transportation Needs: Not on file   Physical Activity: Not on file   Stress: Not on file   Social Connections: Unknown (3/9/2021)    Received from [facility]     Social Connections     Conversations with friends/family/neighbors per week: Not on file   Housing Stability: Low Risk  (7/10/2021)    Received from [facility]    Housing Stability     Mortgage Payment Concerns?: Not on file     Number of Places Lived in the Last Year: Not on file     Unstable Housing?: Not on file        Medications:   Current Outpatient Medications   Medication Sig Dispense Refill    JANUVIA 50 MG Oral Tab       pregabalin 100 MG Oral Cap       levothyroxine 25 MCG Oral Tab       budesonide 0.5 MG/2ML Inhalation Suspension Take 2 mL (0.5 mg total) by nebulization daily. 120 mL 5    SYMBICORT 160-4.5 MCG/ACT Inhalation Aerosol Inhale 2 puffs into the lungs 2 (two) times daily. 1 each 5    SPIRIVA RESPIMAT 2.5 MCG/ACT Inhalation Aero Soln Inhale 2 Inhalations into the lungs daily. 1 each 5    Accu-Chek Softclix Lancets Does not apply Misc as directed      alendronate 70 MG Oral Tab TAKE 1 TABLET ONCE A WEEK 1ST THING IN MORNING ON EMPTY STOMACH WITH FULL GLASS OF WATER      Montelukast Sodium 10 MG Oral Tab Take 1 tablet (10 mg total) by mouth daily.      Magnesium 300 MG Oral Cap 1 capsule with a meal      Esomeprazole Magnesium 40 MG Oral Capsule Delayed Release TAKE 1 CAPSULE BY MOUTH ONCE A DAY FOR ACID REFLUX (STOMACH PROBLEM)      Calcium Carb-Cholecalciferol (CALCIUM+D3) 600-800 MG-UNIT Oral Tab 1 tablet with a meal      Rosuvastatin Calcium 10 MG Oral Capsule Sprinkle TAKE 1 TABLET BY MOUTH AT BEDTIME FOR CHOLESTEROL      Albuterol Sulfate HFA (VENTOLIN HFA) 108 (90 Base) MCG/ACT Inhalation Aero Soln TAKE 2 PUFFS EVERY 6 HOURS AS NEEDED FOR WHEEZING      Telmisartan 80 MG Oral Tab 1 tablet      Lancets (ONETOUCH DELICA PLUS [id number]) Does not apply Misc USE 3 TO 4 TIMES DAILY AS DIRECTED      OneTouch UltraSoft Lancets Does not apply Misc as directed      Alcohol Swabs (PRO COMFORT ALCOHOL) 70 % Does not apply Pads USE 3 TO 4 TIMES DAILY AS DIRECTED         Review of  Systems: Review of Systems     Physical Exam:  /54   Pulse 82   Resp 16   Ht 4' 11\" (1.499 m)   Wt 143 lb (64.9 kg)   SpO2 98%   BMI 28.88 kg/m²      Constitutional: alert, cooperative. No acute distress.  HEENT: Head NC/AT. Nares normal. Septum midline. Mucosa normal. No drainage or sinus tenderness.  Cardio: Regular rate and rhythm. Normal S1 and S2. No murmurs.   Respiratory: Thorax symmetrical with no labored breathing. clear to auscultation bilaterally  Extremities: No clubbing or cyanosis. No BLE edema.    Neurologic: A&Ox3. No gross motor deficits.  Skin: Warm, dry  Psych: Calm, cooperative. Pleasant affect.    Results:  Personally reviewed  XR FOOT WEIGHTBEARING (3 VIEWS), RIGHT   (CPT=73630)  Narrative: PROCEDURE:  XR FOOT WEIGHTBEARING (3 VIEWS), RIGHT (CPT=73630)     INDICATIONS:  S92.511A Closed displaced fracture of proximal phalanx of lesser toe of right foot, initial encounter     COMPARISON:  [facility], XR, XR FOOT, COMPLETE (MIN 3 VIEWS), RIGHT (CPT=73630), 8/13/2024, 11:41 AM.     PATIENT STATED HISTORY: (As transcribed by Technologist)  Podiatry evaluation. Follow up fracture proximal phalanx 2nd toe         FINDINGS:  There is minimal bony callus formation along the healing minimally angulated fracture along the proximal 3rd of the proximal phalanx of the right 2nd digit.  Moderate plantar and dorsal calcaneal spurring.  Scattered mild osteoarthritic   changes in the interphalangeal joints.                   Impression: CONCLUSION:  There is minimal bony callus formation along the healing minimally angulated fracture along the proximal 3rd of the proximal phalanx of the right 2nd digit.          LOCATION:  [facility]        Dictated by (CST): Oh Ryan MD on 9/25/2024 at 11:03 AM       Finalized by (CST): Oh Ryan MD on 9/25/2024 at 11:03 AM         PFTs:       No data to display                   No data to display                    WBC: 8.7, done on 9/28/2024.  HGB: 11.8,  done on 9/28/2024.  PLT: 299, done on 9/28/2024.     Glucose: 139, done on 9/28/2024.  Cr: 1.01, done on 9/28/2024.  Last eGFR was 55 on 9/28/2024.  CA: 10.4, done on 9/28/2024.  Na: 139, done on 9/28/2024.  K: 5.2, done on 9/28/2024.  Cl: 106, done on 9/28/2024.  CO2: 26, done on 9/28/2024.  Last ALB was 4.8% done on 9/28/2024.     XR FOOT WEIGHTBEARING (3 VIEWS), RIGHT   (CPT=73630)    Result Date: 9/25/2024  CONCLUSION:  There is minimal bony callus formation along the healing minimally angulated fracture along the proximal 3rd of the proximal phalanx of the right 2nd digit.    LOCATION:  [facility]   Dictated by (CST): Oh Ryan MD on 9/25/2024 at 11:03 AM     Finalized by (CST): Oh Ryan MD on 9/25/2024 at 11:03 AM       XR FOOT, COMPLETE (MIN 3 VIEWS), RIGHT (CPT=73630)    Result Date: 8/13/2024  CONCLUSION:    FRACTURE proximal aspect of the shaft of the proximal phalanx 2nd toe with minimal displacement.  No other fractures seen.  No dislocation.  Scattered mild arthritic changes not uncommon for age. Plantar calcaneal spur, and spurring at the  insertion of the Achilles tendon onto the calcaneus. Continued clinical and x-ray follow-up advised.  LOCATION:  [facility]   Dictated by (CST): Rick Sauceda MD on 8/13/2024 at 11:57 AM     Finalized by (CST): Rick Sauceda MD on 8/13/2024 at 11:58 AM         Assessment/Plan:  #1. Severe persistent asthma  Continue symbicort/spiriva with budesonide nebs/singulair  We had in depth discussion regarding biologics for asthma I think she would be good candidate, but patient and daughter would like to think about it.  Would likely be good candidate for tezspire.      Return in about 6 months (around 3/30/2025).    I spent 30 minutes obtaining and reviewing records, preparing for the visit including reviewing chart and prior testing, face to face time examining the patient and obtaining history, counseling, arranging and reviewing office-based testing,  independently reviewing relevant studies and documentation exclusive of other billable procedures.      Yris Landin MD  9/30/2024

## 2024-10-25 ENCOUNTER — HOSPITAL ENCOUNTER (OUTPATIENT)
Dept: ULTRASOUND IMAGING | Facility: HOSPITAL | Age: 84
Discharge: HOME | End: 2024-10-25
Attending: FAMILY MEDICINE
Payer: MEDICARE

## 2024-10-25 DIAGNOSIS — I10 ESSENTIAL HYPERTENSION, MALIGNANT: ICD-10-CM

## 2024-10-25 DIAGNOSIS — I73.9 PERIPHERAL VASCULAR DISEASE, UNSPECIFIED (HCC): ICD-10-CM

## 2024-10-25 DIAGNOSIS — E78.5 HYPERLIPEMIA: ICD-10-CM

## 2024-10-25 DIAGNOSIS — E11.9 DIABETES MELLITUS (HCC): ICD-10-CM

## 2024-10-25 PROCEDURE — 93925 LOWER EXTREMITY STUDY: CPT | Performed by: FAMILY MEDICINE

## 2024-12-04 ENCOUNTER — OFFICE VISIT (OUTPATIENT)
Facility: LOCATION | Age: 84
End: 2024-12-04
Payer: MEDICARE

## 2024-12-04 DIAGNOSIS — S92.511D CLOSED DISPLACED FRACTURE OF PROXIMAL PHALANX OF LESSER TOE OF RIGHT FOOT WITH ROUTINE HEALING, SUBSEQUENT ENCOUNTER: Primary | ICD-10-CM

## 2024-12-04 DIAGNOSIS — R23.4 FISSURES IN SKIN OF BOTH FEET: ICD-10-CM

## 2024-12-04 DIAGNOSIS — B35.1 ONYCHOMYCOSIS: ICD-10-CM

## 2024-12-04 DIAGNOSIS — M20.42 HAMMER TOES OF BOTH FEET: ICD-10-CM

## 2024-12-04 DIAGNOSIS — E11.42 DIABETIC POLYNEUROPATHY ASSOCIATED WITH TYPE 2 DIABETES MELLITUS (HCC): ICD-10-CM

## 2024-12-04 DIAGNOSIS — E11.9 TYPE 2 DIABETES MELLITUS WITHOUT COMPLICATION, UNSPECIFIED WHETHER LONG TERM INSULIN USE (HCC): ICD-10-CM

## 2024-12-04 DIAGNOSIS — M79.674 TOE PAIN, RIGHT: ICD-10-CM

## 2024-12-04 DIAGNOSIS — M79.671 RIGHT FOOT PAIN: ICD-10-CM

## 2024-12-04 DIAGNOSIS — L85.3 XEROSIS OF SKIN: ICD-10-CM

## 2024-12-04 DIAGNOSIS — M20.41 HAMMER TOES OF BOTH FEET: ICD-10-CM

## 2024-12-04 PROCEDURE — 99213 OFFICE O/P EST LOW 20 MIN: CPT

## 2024-12-04 NOTE — PROGRESS NOTES
[facility]  Progress Note  12/4/2024    Adrienne Morin is a 84 year old female.   Chief Complaint   Patient presents with    Diabetic Foot Care     84 year old female unable to trim her own nails. A1C was done on 9/28/24 with the result of 7.6, FBS this am was not checked. LOV with PCP was on 10/12/24.         HPI:     Adrienne Morin is a pleasant 84 year old female who presents to the clinic with her daughter who provides translation. Pt is here today for routine nail care and diabetic exam along with follow-up for right second digit fracture. Pt complains of elongated, thickened, and incurvated nails and has difficulty trimming on his/her own. She denies any open sores to feet. Pt states that she had calluses to bilateral feet at last visit, but noted none today. In regards to right second digit fracture, pt has been ambulating in diabetic shoes. Per daughter, diabetic shoes are several years old and she feels they are not well fitting. Past medical history, medications, allergies reviewed.       Allergies: Penicillins   Current Outpatient Medications   Medication Sig Dispense Refill    JANUVIA 50 MG Oral Tab       pregabalin 100 MG Oral Cap       levothyroxine 25 MCG Oral Tab       budesonide 0.5 MG/2ML Inhalation Suspension Take 2 mL (0.5 mg total) by nebulization daily. 120 mL 5    SYMBICORT 160-4.5 MCG/ACT Inhalation Aerosol Inhale 2 puffs into the lungs 2 (two) times daily. 1 each 5    SPIRIVA RESPIMAT 2.5 MCG/ACT Inhalation Aero Soln Inhale 2 Inhalations into the lungs daily. 1 each 5    Accu-Chek Softclix Lancets Does not apply Misc as directed      alendronate 70 MG Oral Tab TAKE 1 TABLET ONCE A WEEK 1ST THING IN MORNING ON EMPTY STOMACH WITH FULL GLASS OF WATER      Montelukast Sodium 10 MG Oral Tab Take 1 tablet (10 mg total) by mouth daily.      Magnesium 300 MG Oral Cap 1 capsule with a meal      Esomeprazole Magnesium 40 MG Oral Capsule Delayed Release TAKE 1 CAPSULE BY MOUTH ONCE A  DAY FOR ACID REFLUX (STOMACH PROBLEM)      Calcium Carb-Cholecalciferol (CALCIUM+D3) 600-800 MG-UNIT Oral Tab 1 tablet with a meal      Rosuvastatin Calcium 10 MG Oral Capsule Sprinkle TAKE 1 TABLET BY MOUTH AT BEDTIME FOR CHOLESTEROL      Albuterol Sulfate HFA (VENTOLIN HFA) 108 (90 Base) MCG/ACT Inhalation Aero Soln TAKE 2 PUFFS EVERY 6 HOURS AS NEEDED FOR WHEEZING      Telmisartan 80 MG Oral Tab 1 tablet      Lancets (ONETOUCH DELICA PLUS [id number]) Does not apply Misc USE 3 TO 4 TIMES DAILY AS DIRECTED      OneTouch UltraSoft Lancets Does not apply Misc as directed      Alcohol Swabs (PRO COMFORT ALCOHOL) 70 % Does not apply Pads USE 3 TO 4 TIMES DAILY AS DIRECTED        Past Medical History:    Allergic rhinitis    Asthma (HCC)    COPD (chronic obstructive pulmonary disease) (HCC)    Diabetes (HCC)    Essential hypertension    Hyperlipidemia    Thyroid disease      Past Surgical History:   Procedure Laterality Date    Cataract        Family History   Problem Relation Age of Onset    Heart Disorder Mother     Diabetes Father     Heart Disorder Father       Social History     Socioeconomic History    Marital status:    Tobacco Use    Smoking status: Never    Smokeless tobacco: Never   Substance and Sexual Activity    Alcohol use: Never    Drug use: Never           REVIEW OF SYSTEMS:     10 point ROS completed and was negative, except for pertinent positive and negatives stated in subjective.       EXAM:     GENERAL: well developed, well nourished, in no apparent distress  EXTREMITIES:  1. Integument: The patient's nails appear incurvated, thickened, elongated, and dystrophic with subungual debris 1-5 right, 1-5 left nails. Skin appears very dry, warm, and supple.. There are no color changes. No open lesions. No macerations. No Hyperkeratotic lesions.   2. Vascular: Pedal pulses palpable, capillary refill normal.  3. Neurological: Gross sensation intact via light touch bilaterally.  Monofilament test with  diminished sensation to bilateral feet via [place] [facility] monofilament testing.   4. Musculoskeletal: All muscle groups are graded 5/5 in the foot and ankle. Denies pain with movement and palpation of right second digit.       ASSESSMENT AND PLAN:   Diagnoses and all orders for this visit:    Closed displaced fracture of proximal phalanx of lesser toe of right foot with routine healing, subsequent encounter  -     DME - EXTERNAL     Toe pain, right  -     DME - EXTERNAL     Type 2 diabetes mellitus without complication, unspecified whether long term insulin use (HCC)  -     DME - EXTERNAL     Right foot pain  -     DME - EXTERNAL     Diabetic polyneuropathy associated with type 2 diabetes mellitus (HCC)  -     DME - EXTERNAL     Hammer toes of both feet  -     DME - EXTERNAL     Xerosis of skin    Fissures in skin of both feet        Plan:   - Patient was seen and evaluated today in clinic.  Chart history reviewed.    - At today's visit trimmed down and debrided hyperkeratoses on bilateral feet. Sharply debrided nails x10 with a sterile nail nipper achieving a 20% reduction in thickness and length, without incident. Slant back procedure performed to ingrown nails. Nails further smoothed with emery board.   - Discussed importance of proper pedal hygiene, regular foot checks, and tight glucose control by following diet and medication regimen.   - Because the patient suffers from neuropathy, pt was educated to keep her feet clean; ensure that she washes and dries between toes.  - Educated the patient on the use of a good moisturizing cream such as Amlactin to help with skin fissure and xerosis of skin to bilateral feet.   -Order for diabetic shoes and inserts provided for patient.   - Educated patient and daughter on acute signs of infection.  Advised patient to seek immediate medical attention if any concerns arise.  - All of the patient's questions and concerns were addressed.  They indicated their understanding of  these issues and agrees to the plan.      Time spent reviewing pertinent information from patient's chart, reviewing any pertinent imaging, obtaining history and physical exam, discussing and mutually agreeing on a treatment plan, and documenting encounter: 30 minutes    RTC 2-3 months.      LILI Garza        [facility]            Dragon speech recognition software was used to prepare this note.  Errors in word recognition may occur.  Please contact me with any questions/concerns with this note.

## 2024-12-23 ENCOUNTER — TELEPHONE (OUTPATIENT)
Facility: LOCATION | Age: 84
End: 2024-12-23

## 2024-12-23 NOTE — TELEPHONE ENCOUNTER
Yannick from [facility] states diabetic shoes/inserts was ordered for patient and they faxed detailed written order and letter of medical necessity to be signed. Yannick states she will fax over forms today again and to please sign and fax back. Please advise.

## 2024-12-27 NOTE — TELEPHONE ENCOUNTER
Per patient calling stating Yannick from [facility] needs further documentation from Dr. Le. Please advise.

## 2025-01-06 ENCOUNTER — TELEPHONE (OUTPATIENT)
Facility: LOCATION | Age: 85
End: 2025-01-06

## 2025-01-06 RX ORDER — BUDESONIDE AND FORMOTEROL FUMARATE DIHYDRATE 160; 4.5 UG/1; UG/1
2 AEROSOL RESPIRATORY (INHALATION) 2 TIMES DAILY
Qty: 10.2 G | Refills: 5 | Status: SHIPPED | OUTPATIENT
Start: 2025-01-06

## 2025-01-06 NOTE — TELEPHONE ENCOUNTER
Received request for: Budesonide-Formoterol      Patient last seen by: By Dr. Landin 9/30/24    Has scheduled appointment: 4/04/25    Physician recommendation: Continue to Symbicort/Spiriva.

## 2025-01-06 NOTE — TELEPHONE ENCOUNTER
Per patient's daughter calling regarding diabetic shoes. Per patient's daughter states that it has been a month and [facility] still has not received documents. Please advise.

## 2025-01-09 NOTE — TELEPHONE ENCOUNTER
Left message to call back on voicemail with [facility]- to get update and find out if they need anything from our office.

## 2025-01-10 ENCOUNTER — PATIENT MESSAGE (OUTPATIENT)
Facility: LOCATION | Age: 85
End: 2025-01-10

## 2025-01-16 ENCOUNTER — TELEPHONE (OUTPATIENT)
Facility: LOCATION | Age: 85
End: 2025-01-16

## 2025-01-16 NOTE — TELEPHONE ENCOUNTER
Per valentina from   services calling stating they haven't received back the order filled out by Dr Le.Please advise     Fax number [phone]

## 2025-01-18 ENCOUNTER — LAB ENCOUNTER (OUTPATIENT)
Dept: LAB | Age: 85
End: 2025-01-18
Attending: FAMILY MEDICINE
Payer: MEDICARE

## 2025-01-18 DIAGNOSIS — I73.9 PERIPHERAL VASCULAR DISEASE, UNSPECIFIED: ICD-10-CM

## 2025-01-18 DIAGNOSIS — M81.0 SENILE OSTEOPOROSIS: ICD-10-CM

## 2025-01-18 DIAGNOSIS — E11.9 DIABETES MELLITUS (HCC): Primary | ICD-10-CM

## 2025-01-18 DIAGNOSIS — R79.89 HYPOURICEMIA: ICD-10-CM

## 2025-01-18 DIAGNOSIS — D64.9 ANEMIA, UNSPECIFIED: ICD-10-CM

## 2025-01-18 DIAGNOSIS — E78.5 HYPERLIPEMIA: ICD-10-CM

## 2025-01-18 DIAGNOSIS — I10 ESSENTIAL HYPERTENSION, MALIGNANT: ICD-10-CM

## 2025-01-18 DIAGNOSIS — R20.8 BURNING SENSATION: ICD-10-CM

## 2025-01-18 LAB
ALBUMIN SERPL-MCNC: 4.5 G/DL (ref 3.2–4.8)
ALBUMIN/GLOB SERPL: 1.4 {RATIO} (ref 1–2)
ALP LIVER SERPL-CCNC: 58 U/L
ALT SERPL-CCNC: 21 U/L
ANION GAP SERPL CALC-SCNC: 8 MMOL/L (ref 0–18)
AST SERPL-CCNC: 24 U/L (ref ?–34)
BASOPHILS # BLD AUTO: 0.05 X10(3) UL (ref 0–0.2)
BASOPHILS NFR BLD AUTO: 0.6 %
BILIRUB SERPL-MCNC: 0.3 MG/DL (ref 0.2–1.1)
BUN BLD-MCNC: 16 MG/DL (ref 9–23)
CALCIUM BLD-MCNC: 9.8 MG/DL (ref 8.7–10.6)
CHLORIDE SERPL-SCNC: 103 MMOL/L (ref 98–112)
CHOLEST SERPL-MCNC: 139 MG/DL (ref ?–200)
CO2 SERPL-SCNC: 26 MMOL/L (ref 21–32)
CREAT BLD-MCNC: 1.02 MG/DL
EGFRCR SERPLBLD CKD-EPI 2021: 55 ML/MIN/1.73M2 (ref 60–?)
EOSINOPHIL # BLD AUTO: 0.33 X10(3) UL (ref 0–0.7)
EOSINOPHIL NFR BLD AUTO: 4.1 %
ERYTHROCYTE [DISTWIDTH] IN BLOOD BY AUTOMATED COUNT: 12.7 %
EST. AVERAGE GLUCOSE BLD GHB EST-MCNC: 200 MG/DL (ref 68–126)
FASTING PATIENT LIPID ANSWER: YES
FASTING STATUS PATIENT QL REPORTED: YES
GLOBULIN PLAS-MCNC: 3.3 G/DL (ref 2–3.5)
GLUCOSE BLD-MCNC: 147 MG/DL (ref 70–99)
HBA1C MFR BLD: 8.6 % (ref ?–5.7)
HCT VFR BLD AUTO: 37 %
HDLC SERPL-MCNC: 50 MG/DL (ref 40–59)
HGB BLD-MCNC: 11.9 G/DL
IMM GRANULOCYTES # BLD AUTO: 0.02 X10(3) UL (ref 0–1)
IMM GRANULOCYTES NFR BLD: 0.2 %
IRON SERPL-MCNC: 69 UG/DL
LDLC SERPL CALC-MCNC: 62 MG/DL (ref ?–100)
LYMPHOCYTES # BLD AUTO: 2.53 X10(3) UL (ref 1–4)
LYMPHOCYTES NFR BLD AUTO: 31.2 %
MCH RBC QN AUTO: 29.7 PG (ref 26–34)
MCHC RBC AUTO-ENTMCNC: 32.2 G/DL (ref 31–37)
MCV RBC AUTO: 92.3 FL
MONOCYTES # BLD AUTO: 0.7 X10(3) UL (ref 0.1–1)
MONOCYTES NFR BLD AUTO: 8.6 %
NEUTROPHILS # BLD AUTO: 4.47 X10 (3) UL (ref 1.5–7.7)
NEUTROPHILS # BLD AUTO: 4.47 X10(3) UL (ref 1.5–7.7)
NEUTROPHILS NFR BLD AUTO: 55.3 %
NONHDLC SERPL-MCNC: 89 MG/DL (ref ?–130)
OSMOLALITY SERPL CALC.SUM OF ELEC: 288 MOSM/KG (ref 275–295)
PLATELET # BLD AUTO: 287 10(3)UL (ref 150–450)
POTASSIUM SERPL-SCNC: 5.1 MMOL/L (ref 3.5–5.1)
PROT SERPL-MCNC: 7.8 G/DL (ref 5.7–8.2)
RBC # BLD AUTO: 4.01 X10(6)UL
SODIUM SERPL-SCNC: 137 MMOL/L (ref 136–145)
TRIGL SERPL-MCNC: 158 MG/DL (ref 30–149)
TSI SER-ACNC: 4.31 UIU/ML (ref 0.55–4.78)
VIT B12 SERPL-MCNC: 1034 PG/ML (ref 211–911)
VLDLC SERPL CALC-MCNC: 23 MG/DL (ref 0–30)
WBC # BLD AUTO: 8.1 X10(3) UL (ref 4–11)

## 2025-01-18 PROCEDURE — 83036 HEMOGLOBIN GLYCOSYLATED A1C: CPT

## 2025-01-18 PROCEDURE — 82607 VITAMIN B-12: CPT

## 2025-01-18 PROCEDURE — 83540 ASSAY OF IRON: CPT

## 2025-01-18 PROCEDURE — 80061 LIPID PANEL: CPT

## 2025-01-18 PROCEDURE — 85025 COMPLETE CBC W/AUTO DIFF WBC: CPT

## 2025-01-18 PROCEDURE — 80053 COMPREHEN METABOLIC PANEL: CPT

## 2025-01-18 PROCEDURE — 84443 ASSAY THYROID STIM HORMONE: CPT

## 2025-01-18 PROCEDURE — 36415 COLL VENOUS BLD VENIPUNCTURE: CPT

## 2025-01-24 NOTE — TELEPHONE ENCOUNTER
Patient daughter would like an update regarding the order.Daughter would like a call back update .

## 2025-01-27 NOTE — TELEPHONE ENCOUNTER
Spoke with Edin from . Confirmed correct fax number. Did explain that we are at separate offices and that provider is only in that office on certain days. I advised I would alert provider and MA to be on lookout for this paperwork via fax. Called daughter and gave update.

## 2025-01-27 NOTE — TELEPHONE ENCOUNTER
Per Edin calling from [facility] following up requesting a detailed written order. Please advise.    Fax # [phone]

## 2025-01-31 ENCOUNTER — MED REC SCAN ONLY (OUTPATIENT)
Facility: LOCATION | Age: 85
End: 2025-01-31

## 2025-02-05 NOTE — TELEPHONE ENCOUNTER
Spoke with Latrice and  and she informed me they have all the paperwork needed and nothing further needed. Patient has scheduled appointment on 2/17.

## 2025-03-07 ENCOUNTER — OFFICE VISIT (OUTPATIENT)
Facility: LOCATION | Age: 85
End: 2025-03-07
Payer: MEDICARE

## 2025-03-07 DIAGNOSIS — M20.41 HAMMER TOES OF BOTH FEET: ICD-10-CM

## 2025-03-07 DIAGNOSIS — B35.1 ONYCHOMYCOSIS: Primary | ICD-10-CM

## 2025-03-07 DIAGNOSIS — E11.9 TYPE 2 DIABETES MELLITUS WITHOUT COMPLICATION, UNSPECIFIED WHETHER LONG TERM INSULIN USE (HCC): ICD-10-CM

## 2025-03-07 DIAGNOSIS — E11.42 DIABETIC POLYNEUROPATHY ASSOCIATED WITH TYPE 2 DIABETES MELLITUS (HCC): ICD-10-CM

## 2025-03-07 DIAGNOSIS — M20.42 HAMMER TOES OF BOTH FEET: ICD-10-CM

## 2025-03-07 DIAGNOSIS — M79.674 TOE PAIN, RIGHT: ICD-10-CM

## 2025-03-07 DIAGNOSIS — L85.3 DRY SKIN: ICD-10-CM

## 2025-03-07 DIAGNOSIS — L60.3 NAIL DYSTROPHY: ICD-10-CM

## 2025-03-07 PROCEDURE — 99213 OFFICE O/P EST LOW 20 MIN: CPT | Performed by: PODIATRIST

## 2025-03-07 NOTE — PROGRESS NOTES
[facility]  Progress Note      Adrienne Morin is a 84 year old female.   Chief Complaint   Patient presents with    Diabetic Foot Care     Nail trim and foot check f/u- last A1c=8.6 on 01/18/2025 - LOV w/ Linus on 12/13/2025- FBS this am was not taken         HPI:     Patient is a pleasant 84-year-old diabetic female who is returning to clinic today with complaints of thickened, elongated, uncomfortable toenails to both feet.  Patient utilized her daughter for translation today, who is accompanying her.  Patient is unable to safely trim her toenails herself and is hoping to discuss debridement.  Patient continues to have some pain and discomfort to her right second digit, which has a known previous fracture.  Denies noticing any deformities to the toe.  Patient is currently in the process of receiving new diabetic shoes and inserts through [facility].  Patient denies recent injuries, open wounds, or other pedal complaints at this time.  Most recent hemoglobin A1c was 8.6% on 1/18/2025.  No other concerns.      Allergies: Penicillins   Current Outpatient Medications   Medication Sig Dispense Refill    Budesonide-Formoterol Fumarate 160-4.5 MCG/ACT Inhalation Aerosol INHALE 2 PUFFS INTO THE LUNGS 2 (TWO) TIMES DAILY. 10.2 g 5    JANUVIA 50 MG Oral Tab       pregabalin 100 MG Oral Cap       levothyroxine 25 MCG Oral Tab       budesonide 0.5 MG/2ML Inhalation Suspension Take 2 mL (0.5 mg total) by nebulization daily. 120 mL 5    SPIRIVA RESPIMAT 2.5 MCG/ACT Inhalation Aero Soln Inhale 2 Inhalations into the lungs daily. 1 each 5    Accu-Chek Softclix Lancets Does not apply Misc as directed      alendronate 70 MG Oral Tab TAKE 1 TABLET ONCE A WEEK 1ST THING IN MORNING ON EMPTY STOMACH WITH FULL GLASS OF WATER      Montelukast Sodium 10 MG Oral Tab Take 1 tablet (10 mg total) by mouth daily.      Magnesium 300 MG Oral Cap 1 capsule with a meal      Esomeprazole Magnesium 40 MG Oral Capsule Delayed  Release TAKE 1 CAPSULE BY MOUTH ONCE A DAY FOR ACID REFLUX (STOMACH PROBLEM)      Calcium Carb-Cholecalciferol (CALCIUM+D3) 600-800 MG-UNIT Oral Tab 1 tablet with a meal      Rosuvastatin Calcium 10 MG Oral Capsule Sprinkle TAKE 1 TABLET BY MOUTH AT BEDTIME FOR CHOLESTEROL      Albuterol Sulfate HFA (VENTOLIN HFA) 108 (90 Base) MCG/ACT Inhalation Aero Soln TAKE 2 PUFFS EVERY 6 HOURS AS NEEDED FOR WHEEZING      Telmisartan 80 MG Oral Tab 1 tablet      Lancets (ONETOUCH DELICA PLUS [id number]) Does not apply Misc USE 3 TO 4 TIMES DAILY AS DIRECTED      OneTouch UltraSoft Lancets Does not apply Misc as directed      Alcohol Swabs (PRO COMFORT ALCOHOL) 70 % Does not apply Pads USE 3 TO 4 TIMES DAILY AS DIRECTED        Past Medical History:    Allergic rhinitis    Asthma (HCC)    COPD (chronic obstructive pulmonary disease) (HCC)    Diabetes (HCC)    Essential hypertension    Hyperlipidemia    Thyroid disease      Past Surgical History:   Procedure Laterality Date    Cataract        Family History   Problem Relation Age of Onset    Heart Disorder Mother     Diabetes Father     Heart Disorder Father       Social History     Socioeconomic History    Marital status:    Tobacco Use    Smoking status: Never    Smokeless tobacco: Never   Substance and Sexual Activity    Alcohol use: Never    Drug use: Never           REVIEW OF SYSTEMS:     10 point ROS completed and was negative unless stated in HPI.      EXAM:     GENERAL: well developed, well nourished, in no apparent distress  EXTREMITIES:  1. Integument: The patient's nails appear thickened, elongated, and dystrophic with subungual debris 1-5 right, 1-5 left nails. Skin appears dry, warm, and supple. There are no color changes. No open lesions. No macerations. No Hyperkeratotic lesions.   2. Vascular: Pedal pulses palpable, capillary refill normal.  3. Neurological: Gross sensation intact via light touch bilaterally.  Monofilament test with diminished sensation to  bilateral feet via [place] [facility] monofilament testing.   4. Musculoskeletal: All muscle groups are graded 5/5 in the foot and ankle. Denies pain with movement and palpation of right second digit.  Mild discomfort with palpation of right second digit.  No acute deformities noted.       ASSESSMENT AND PLAN:   Diagnoses and all orders for this visit:    Onychomycosis    Nail dystrophy    Dry skin    Type 2 diabetes mellitus without complication, unspecified whether long term insulin use (HCC)    Diabetic polyneuropathy associated with type 2 diabetes mellitus (HCC)    Hammer toes of both feet    Toe pain, right        Plan:   -Patient was seen and evaluated today in clinic.  Chart history reviewed.    -Patient still has mild pain to right second digit following a known fracture.  No acute deformities, edema, or concerns of nonhealing fracture at this time.  Will consider updated radiographs if patient's pain does not continue to improve, which it has.    -Discussed treatment options with the patient.  -Discussed with patient proper care and hygiene for their feet.   -If thickness of the toenails are causing discomfort, advised patient to try utilizing Vicks VapoRub on her toenails every night.  The goal would be for the toenails to soften over time.  -Patient relates pain relief with intermittent toenail debridements.  -Patient elects for nail debridement today. Toenails 1-5 of the left foot and 1-5 of the right foot were debrided today.  They tolerated procedures well, without incident.    -Instrumentation used includes nail nippers.    -Discussed importance of proper pedal hygiene, daily foot checks, and tight glycemic control.    -Patient to avoid walking barefoot. Recommend ambulating with supportive diabetic shoes and inserts.    -Patient to monitor for acute signs of infection and seek immediate medical attention if any signs of infection or other concerns arise.      -All of the patient's questions and  concerns were addressed.  They indicated their understanding of these issues and agrees to the plan.      RTC 3 months    Gordo Le DPM, AACFAS        3/7/2025    [address] [address]   [email or web address]            Foneshow speech recognition software was used to prepare this note.  Errors in word recognition may occur.  Please contact me with any questions/concerns with this note.

## 2025-04-03 ENCOUNTER — OFFICE VISIT (OUTPATIENT)
Facility: CLINIC | Age: 85
End: 2025-04-03
Payer: MEDICARE

## 2025-04-03 VITALS
WEIGHT: 143 LBS | OXYGEN SATURATION: 96 % | RESPIRATION RATE: 16 BRPM | HEART RATE: 82 BPM | BODY MASS INDEX: 28.83 KG/M2 | HEIGHT: 59 IN

## 2025-04-03 DIAGNOSIS — J45.50 SEVERE PERSISTENT ASTHMA WITHOUT COMPLICATION (HCC): Primary | ICD-10-CM

## 2025-04-03 PROCEDURE — 99213 OFFICE O/P EST LOW 20 MIN: CPT | Performed by: INTERNAL MEDICINE

## 2025-04-03 RX ORDER — BUDESONIDE 0.5 MG/2ML
0.5 INHALANT ORAL DAILY
Qty: 360 ML | Refills: 5 | Status: SHIPPED | OUTPATIENT
Start: 2025-04-03

## 2025-04-03 NOTE — PROGRESS NOTES
The following individual(s) verbally consented to be recorded using ambient AI listening technology and understand that they can each withdraw their consent to this listening technology at any point by asking the clinician to turn off or pause the recording:    Patient name: Adrienne Morin  Additional names:  jules aponte

## 2025-04-03 NOTE — PROGRESS NOTES
[facility] Pulmonary Follow Up Note    Chief Complaint:  Chief Complaint   Patient presents with    Follow - Up     6 month f/u       History of Present Illness:  History of Present Illness  Adrienne Morin is an 84 year old female with chronic respiratory issues who presents for follow-up on her breathing management.    Her breathing is stable and has improved since starting steroids. She continues to use Symbicort, Spiriva, and budesonide regularly, and albuterol as needed. She experiences shortness of breath with activities such as climbing stairs or performing strenuous tasks, but notes it is better than before. She no longer experiences wheezing.    She is planning to travel to [facility] in May for three to six months.         Past Medical History:   Past Medical History:    Allergic rhinitis    Asthma (HCC)    COPD (chronic obstructive pulmonary disease) (HCC)    Diabetes (HCC)    Essential hypertension    Hyperlipidemia    Thyroid disease        Past Surgical History:   Past Surgical History:   Procedure Laterality Date    Cataract         Family Medical History:   Family History   Problem Relation Age of Onset    Heart Disorder Mother     Diabetes Father     Heart Disorder Father         Social History:   Social History     Socioeconomic History    Marital status:      Spouse name: Not on file    Number of children: Not on file    Years of education: Not on file    Highest education level: Not on file   Occupational History    Not on file   Tobacco Use    Smoking status: Never    Smokeless tobacco: Never   Substance and Sexual Activity    Alcohol use: Never    Drug use: Never    Sexual activity: Not on file   Other Topics Concern    Not on file   Social History Narrative    Not on file     Social Drivers of Health     Food Insecurity: Not on file   Transportation Needs: Not on file   Stress: Not on file   Housing Stability: Low Risk  (7/10/2021)    Received from [facility]    Housing  Stability     Mortgage Payment Concerns?: Not on file     Number of Places Lived in the Last Year: Not on file     Unstable Housing?: Not on file        Medications:   Current Outpatient Medications   Medication Sig Dispense Refill    Budesonide-Formoterol Fumarate 160-4.5 MCG/ACT Inhalation Aerosol INHALE 2 PUFFS INTO THE LUNGS 2 (TWO) TIMES DAILY. 10.2 g 5    JANUVIA 50 MG Oral Tab       pregabalin 100 MG Oral Cap       levothyroxine 25 MCG Oral Tab       budesonide 0.5 MG/2ML Inhalation Suspension Take 2 mL (0.5 mg total) by nebulization daily. 120 mL 5    SPIRIVA RESPIMAT 2.5 MCG/ACT Inhalation Aero Soln Inhale 2 Inhalations into the lungs daily. 1 each 5    Accu-Chek Softclix Lancets Does not apply Misc as directed      alendronate 70 MG Oral Tab TAKE 1 TABLET ONCE A WEEK 1ST THING IN MORNING ON EMPTY STOMACH WITH FULL GLASS OF WATER      Montelukast Sodium 10 MG Oral Tab Take 1 tablet (10 mg total) by mouth daily.      Magnesium 300 MG Oral Cap 1 capsule with a meal      Esomeprazole Magnesium 40 MG Oral Capsule Delayed Release TAKE 1 CAPSULE BY MOUTH ONCE A DAY FOR ACID REFLUX (STOMACH PROBLEM)      Calcium Carb-Cholecalciferol (CALCIUM+D3) 600-800 MG-UNIT Oral Tab 1 tablet with a meal      Rosuvastatin Calcium 10 MG Oral Capsule Sprinkle TAKE 1 TABLET BY MOUTH AT BEDTIME FOR CHOLESTEROL      Albuterol Sulfate HFA (VENTOLIN HFA) 108 (90 Base) MCG/ACT Inhalation Aero Soln TAKE 2 PUFFS EVERY 6 HOURS AS NEEDED FOR WHEEZING      Telmisartan 80 MG Oral Tab 1 tablet      Lancets (ONETOUCH DELICA PLUS [id number]) Does not apply Misc USE 3 TO 4 TIMES DAILY AS DIRECTED      OneTouch UltraSoft Lancets Does not apply Misc as directed      Alcohol Swabs (PRO COMFORT ALCOHOL) 70 % Does not apply Pads USE 3 TO 4 TIMES DAILY AS DIRECTED         Review of Systems: Review of Systems     Physical Exam:  Pulse 82   Resp 16   Ht 4' 11\" (1.499 m)   Wt 143 lb (64.9 kg)   SpO2 96%   BMI 28.88 kg/m²      Constitutional: alert,  cooperative. No acute distress.  HEENT: Head NC/AT. Nares normal. Septum midline. Mucosa normal. No drainage or sinus tenderness.  Cardio: Regular rate and rhythm. Normal S1 and S2. No murmurs.   Respiratory: Thorax symmetrical with no labored breathing. clear to auscultation bilaterally  Extremities: No clubbing or cyanosis. No BLE edema.    Neurologic: A&Ox3. No gross motor deficits.  Skin: Warm, dry  Psych: Calm, cooperative. Pleasant affect.    Results:  Images personally reviewed - reading is my own personal review  Results         PFTs:       No data to display                   No data to display                    WBC: 8.1, done on 1/18/2025.HGB: 11.9, done on 1/18/2025.PLT: 287, done on 1/18/2025.     Glucose: 147, done on 1/18/2025.Cr: 1.02, done on 1/18/2025.Last eGFR was 55 on 1/18/2025.CA: 9.8, done on 1/18/2025.Na: 137, done on 1/18/2025.K: 5.1, done on 1/18/2025.Cl: 103, done on 1/18/2025.CO2: 26, done on 1/18/2025.Last ALB was 4.5% done on 1/18/2025.     No results found.     Assessment/Plan:  Assessment & Plan  Severe persistent asthma  Asthma well-managed with current medications. Improvement noted with steroids. Discussed biologic therapy (Fasenra) benefits and administration. Patient hesitant due to travel plans.  - Continue Symbicort, Spiriva, and budesonide.  - Consider biologic therapy (Fasenra) upon return from travel.  - Use albuterol as needed.         Return in about 6 months (around 10/3/2025).    I spent 20 minutes obtaining and reviewing records, preparing for the visit including reviewing chart and prior testing, face to face time examining the patient and obtaining history, counseling, arranging and reviewing office-based testing, independently reviewing relevant studies and documentation exclusive of other billable procedures.      Yris Landin MD  4/3/2025

## 2025-04-04 RX ORDER — TIOTROPIUM BROMIDE INHALATION SPRAY 3.12 UG/1
2 SPRAY, METERED RESPIRATORY (INHALATION) DAILY
Qty: 1 EACH | Refills: 4 | Status: SHIPPED | OUTPATIENT
Start: 2025-04-04

## 2025-04-04 NOTE — TELEPHONE ENCOUNTER
Received request for:  Spiriva Respimat 2.5 mcg    Patient last seen by: Dr. Landin on 4-3-25    Has scheduled appointment: none    Physician recommendation:  On 9-30-24, pt instructed to continue Spiriva with Symbicort.  Return in 6 month.      Refill forwarded to Dr. Landin for his review.